# Patient Record
(demographics unavailable — no encounter records)

---

## 2025-06-23 NOTE — HISTORY OF PRESENT ILLNESS
[de-identified] : The patient presents with a history of a chronic middle ear effusion here for ear tube eval.  No problems with ear infections.  No parental concerns with hearing.  No known Speech Delay.  No problems with swallowing or with VPI/nasal regurgitation. No throat/tonsil infections.   Passed NBHT AU. Full term,  uncomplicated delivery with uncomplicated pregnancy. No cyanosis, no ETT intubation, no home oxygen requirement, no NICU stay

## 2025-06-23 NOTE — REASON FOR VISIT
[Initial Evaluation] : an initial evaluation for [Father] : father [FreeTextEntry2] : fluid in left ear

## 2025-06-23 NOTE — ASSESSMENT
[FreeTextEntry1] : History of  otitis media. I discussed the option of ear tube placement. I also discussed the option of expectant management (watchful waiting and prn antibiotics). At this time, the family wishes to proceed with expectant management, which I think is reasonable. The patient may get antibiotics as indicated but should worrisome features such as a persistent middle effusion or the family opts, tubes should be reconsidered.   If there is an intact ear drum with an ear infection and the child is greater than 2 years, may trial tylenol alternating with motrin and consider oral antibiotics if no improvement after 24 hours (typically reserved for Acute Otitis Media with intact eardrums (should the ear tube extrude sooner than expected) or for patients with draining ear tubes as well as erythema/cellulitis of the tragus/skin or worsening symptoms).   IF the patient has a middle ear effusion for >3months with hearing loss or complications for the effusion/speech delay OR recurrent acute otitis media (4 episodes in a year with a recent one, or 3 in 6 months WITH a middle ear effusion AT the time of evaluation), then patient is a candidate for ear tubes.

## 2025-06-23 NOTE — CONSULT LETTER
[Dear  ___] : Dear  [unfilled], [Consult Letter:] : I had the pleasure of evaluating your patient, [unfilled]. [Please see my note below.] : Please see my note below. [Consult Closing:] : Thank you very much for allowing me to participate in the care of this patient.  If you have any questions, please do not hesitate to contact me. [Sincerely,] : Sincerely, [FreeTextEntry2] : Todd Gr MD [facility] - [address], [address] [place] [address] [FreeTextEntry3] : Brittany Estrella MD   Pediatric Otolaryngology/ Head & Neck Surgery [facility] , Otolaryngology; [facility]  [facility] [place] [address] Tel [phone] Fax [phone]